# Patient Record
Sex: FEMALE | Race: WHITE | NOT HISPANIC OR LATINO | Employment: OTHER | URBAN - METROPOLITAN AREA
[De-identification: names, ages, dates, MRNs, and addresses within clinical notes are randomized per-mention and may not be internally consistent; named-entity substitution may affect disease eponyms.]

---

## 2023-04-26 RX ORDER — PANTOPRAZOLE SODIUM 40 MG/1
TABLET, DELAYED RELEASE ORAL
COMMUNITY
Start: 2023-04-14

## 2023-04-26 RX ORDER — SEMAGLUTIDE 1.34 MG/ML
INJECTION, SOLUTION SUBCUTANEOUS
COMMUNITY
Start: 2023-04-18

## 2023-04-26 RX ORDER — ATORVASTATIN CALCIUM 10 MG/1
TABLET, FILM COATED ORAL
COMMUNITY
Start: 2023-04-14

## 2023-04-26 RX ORDER — INSULIN GLARGINE 300 U/ML
INJECTION, SOLUTION SUBCUTANEOUS
COMMUNITY
Start: 2023-04-14

## 2023-04-26 RX ORDER — GLIMEPIRIDE 4 MG/1
TABLET ORAL
COMMUNITY
Start: 2023-04-20

## 2023-04-27 ENCOUNTER — OFFICE VISIT (OUTPATIENT)
Dept: GASTROENTEROLOGY | Facility: CLINIC | Age: 63
End: 2023-04-27

## 2023-04-27 VITALS
OXYGEN SATURATION: 98 % | SYSTOLIC BLOOD PRESSURE: 129 MMHG | DIASTOLIC BLOOD PRESSURE: 69 MMHG | HEART RATE: 104 BPM | HEIGHT: 65 IN | BODY MASS INDEX: 25.33 KG/M2 | WEIGHT: 152 LBS

## 2023-04-27 DIAGNOSIS — R14.0 BLOATING: ICD-10-CM

## 2023-04-27 DIAGNOSIS — R10.11 RIGHT UPPER QUADRANT ABDOMINAL PAIN: Primary | ICD-10-CM

## 2023-04-27 DIAGNOSIS — R10.31 RIGHT LOWER QUADRANT ABDOMINAL PAIN: ICD-10-CM

## 2023-04-27 DIAGNOSIS — R10.13 EPIGASTRIC ABDOMINAL PAIN: ICD-10-CM

## 2023-04-27 RX ORDER — ESOMEPRAZOLE MAGNESIUM 40 MG/1
40 CAPSULE, DELAYED RELEASE ORAL
Qty: 31 CAPSULE | Refills: 6 | Status: SHIPPED | OUTPATIENT
Start: 2023-04-27

## 2023-04-27 NOTE — PATIENT INSTRUCTIONS
Scheduled date of EGD(as of today):6/28/23  Physician performing EGD:Brayan  Location of EGD:[place]  Instructions reviewed with patient by:Ren herrera  Clearances:  none

## 2023-04-27 NOTE — LETTER
"May 1, 2023     [address]  [address] [place] [address]    Patient: Pako Dumont   YOB: 1960   Date of Visit: 4/27/2023       Dear Dr Geraldine Zheng: Thank you for referring Pako Dumont to me for evaluation  Below are my notes for this consultation  If you have questions, please do not hesitate to call me  I look forward to following your patient along with you  Sincerely,        Carrie Reddy DO        CC: No Recipients  Maya Castillo  4/27/2023 10:57 PM  Signed  [facility] - Outpatient Consultation  Pako Dumont 61 y o  female MRN: [id number]  Encounter: [id number]          ASSESSMENT AND PLAN:      1  Right upper quadrant abdominal pain  - US right upper quadrant; Future  - esomeprazole (NexIUM) 40 MG capsule; Take 1 capsule (40 mg total) by mouth daily before breakfast  Dispense: 31 capsule; Refill: 6  - EGD; Future    2  Epigastric abdominal pain  - esomeprazole (NexIUM) 40 MG capsule; Take 1 capsule (40 mg total) by mouth daily before breakfast  Dispense: 31 capsule; Refill: 6  - EGD; Future    3  Right lower quadrant abdominal pain    4  Bloating    ______________________________________________________________________    HPI:  This 61year old female presents to the office with the chief complaint of  An abdominal pain that starts up near the epigastrium and radiates in a \"C\" shape around the abdomen to the right lower quadrant and then to the suprapubic area  Shaped like a C  The pain comes and goes and is not provoked by anything in particular  There is nausea on occasion but not vomiting  She has a bowel movement daily  She admits to heartburn and acid reflux problems not controlled by pantoprazole  She's been taking the pantoprazole for over 5 years  No dysphagia or odynophagia     She had a colonoscopy performed on 2/21/23 which showed hemorrhoids and possible sigmoid colitis, " however, biopsies of the sigmoid colon did not demonstrate colitis  She also had diverticulosis  She denies any rectal bleeding, hematemesis, or melena  There is no family history of colon cancer  She states that she has been told in the past that she has a fatty liver but her last US was several years ago  She has not had an upper endoscopy  There is bloating and gaseousness  REVIEW OF SYSTEMS:    CONSTITUTIONAL: Denies any fever, chills, rigors, and weight loss  HEENT: No earache or tinnitus  Denies hearing loss or visual disturbances  CARDIOVASCULAR: No chest pain or palpitations  RESPIRATORY: Denies any cough, hemoptysis, shortness of breath or dyspnea on exertion  GASTROINTESTINAL: As noted in the History of Present Illness  GENITOURINARY: No problems with urination  Denies any hematuria or dysuria  NEUROLOGIC: No dizziness or vertigo, denies headaches  MUSCULOSKELETAL: Denies any muscle or joint pain  SKIN: Denies skin rashes or itching  ENDOCRINE: Denies excessive thirst  Denies intolerance to heat or cold  PSYCHOSOCIAL: Denies depression or anxiety  Denies any recent memory loss  Historical Information   Past Medical History:   Diagnosis Date    GERD (gastroesophageal reflux disease)     Hyperlipidemia      History reviewed  No pertinent surgical history  Social History   Social History     Substance and Sexual Activity   Alcohol Use Not Currently     Social History     Substance and Sexual Activity   Drug Use Not on file     Social History     Tobacco Use   Smoking Status Never   Smokeless Tobacco Never     History reviewed  No pertinent family history      Meds/Allergies        Current Outpatient Medications:     atorvastatin (LIPITOR) 10 mg tablet    Diclofenac Sodium (VOLTAREN) 1 %    esomeprazole (NexIUM) 40 MG capsule    glimepiride (AMARYL) 4 mg tablet    metFORMIN (GLUCOPHAGE) 500 mg tablet    Ozempic, 1 MG/DOSE, 4 MG/3ML injection pen   "pantoprazole (PROTONIX) 40 mg tablet    Toujeo SoloStar 300 units/mL CONCENTRATED U-300 injection pen (1-unit dial)    No Known Allergies        Objective      Blood pressure 129/69, pulse 104, height 5' 5\" (1 651 m), weight 68 9 kg (152 lb), SpO2 98 %  Body mass index is 25 29 kg/m²  PHYSICAL EXAM:      General Appearance:   Alert, cooperative, no distress   HEENT:   Normocephalic, atraumatic, anicteric      Neck:  Supple, symmetrical, trachea midline   Lungs:   Clear to auscultation bilaterally; no rales, rhonchi or wheezing; respirations unlabored    Heart[de-identified]   Regular rate and rhythm; no murmur, rub, or gallop  Abdomen:   Soft, non-tender, non-distended; normal bowel sounds; no masses, no organomegaly    Genitalia:   Deferred    Rectal:   Deferred    Extremities:  No cyanosis, clubbing or edema    Pulses:  2+ and symmetric    Skin:  No jaundice, rashes, or lesions    Lymph nodes:  No palpable cervical lymphadenopathy        Lab Results:   No visits with results within 1 Day(s) from this visit  Latest known visit with results is:   No results found for any previous visit  Radiology Results:   No results found      "

## 2023-04-28 NOTE — PROGRESS NOTES
"[facility] - Outpatient Consultation  Obed Villafana 61 y o  female MRN: [id number]  Encounter: [id number]          ASSESSMENT AND PLAN:      1  Right upper quadrant abdominal pain  - US right upper quadrant; Future  - esomeprazole (NexIUM) 40 MG capsule; Take 1 capsule (40 mg total) by mouth daily before breakfast  Dispense: 31 capsule; Refill: 6  - EGD; Future    2  Epigastric abdominal pain  - esomeprazole (NexIUM) 40 MG capsule; Take 1 capsule (40 mg total) by mouth daily before breakfast  Dispense: 31 capsule; Refill: 6  - EGD; Future    3  Right lower quadrant abdominal pain    4  Bloating    ______________________________________________________________________    HPI:  This 61year old female presents to the office with the chief complaint of  An abdominal pain that starts up near the epigastrium and radiates in a \"C\" shape around the abdomen to the right lower quadrant and then to the suprapubic area  Shaped like a C  The pain comes and goes and is not provoked by anything in particular  There is nausea on occasion but not vomiting  She has a bowel movement daily  She admits to heartburn and acid reflux problems not controlled by pantoprazole  She's been taking the pantoprazole for over 5 years  No dysphagia or odynophagia  She had a colonoscopy performed on 2/21/23 which showed hemorrhoids and possible sigmoid colitis, however, biopsies of the sigmoid colon did not demonstrate colitis  She also had diverticulosis  She denies any rectal bleeding, hematemesis, or melena  There is no family history of colon cancer  She states that she has been told in the past that she has a fatty liver but her last US was several years ago  She has not had an upper endoscopy  There is bloating and gaseousness  REVIEW OF SYSTEMS:    CONSTITUTIONAL: Denies any fever, chills, rigors, and weight loss  HEENT: No earache or tinnitus   Denies hearing loss or visual " "disturbances  CARDIOVASCULAR: No chest pain or palpitations  RESPIRATORY: Denies any cough, hemoptysis, shortness of breath or dyspnea on exertion  GASTROINTESTINAL: As noted in the History of Present Illness  GENITOURINARY: No problems with urination  Denies any hematuria or dysuria  NEUROLOGIC: No dizziness or vertigo, denies headaches  MUSCULOSKELETAL: Denies any muscle or joint pain  SKIN: Denies skin rashes or itching  ENDOCRINE: Denies excessive thirst  Denies intolerance to heat or cold  PSYCHOSOCIAL: Denies depression or anxiety  Denies any recent memory loss  Historical Information   Past Medical History:   Diagnosis Date   • GERD (gastroesophageal reflux disease)    • Hyperlipidemia      History reviewed  No pertinent surgical history  Social History   Social History     Substance and Sexual Activity   Alcohol Use Not Currently     Social History     Substance and Sexual Activity   Drug Use Not on file     Social History     Tobacco Use   Smoking Status Never   Smokeless Tobacco Never     History reviewed  No pertinent family history  Meds/Allergies       Current Outpatient Medications:   •  atorvastatin (LIPITOR) 10 mg tablet  •  Diclofenac Sodium (VOLTAREN) 1 %  •  esomeprazole (NexIUM) 40 MG capsule  •  glimepiride (AMARYL) 4 mg tablet  •  metFORMIN (GLUCOPHAGE) 500 mg tablet  •  Ozempic, 1 MG/DOSE, 4 MG/3ML injection pen  •  pantoprazole (PROTONIX) 40 mg tablet  •  Toujeo SoloStar 300 units/mL CONCENTRATED U-300 injection pen (1-unit dial)    No Known Allergies        Objective     Blood pressure 129/69, pulse 104, height 5' 5\" (1 651 m), weight 68 9 kg (152 lb), SpO2 98 %  Body mass index is 25 29 kg/m²          PHYSICAL EXAM:      General Appearance:   Alert, cooperative, no distress   HEENT:   Normocephalic, atraumatic, anicteric      Neck:  Supple, symmetrical, trachea midline   Lungs:   Clear to auscultation bilaterally; no rales, rhonchi or wheezing; respirations unlabored  " Heart[de-identified]   Regular rate and rhythm; no murmur, rub, or gallop  Abdomen:   Soft, non-tender, non-distended; normal bowel sounds; no masses, no organomegaly    Genitalia:   Deferred    Rectal:   Deferred    Extremities:  No cyanosis, clubbing or edema    Pulses:  2+ and symmetric    Skin:  No jaundice, rashes, or lesions    Lymph nodes:  No palpable cervical lymphadenopathy        Lab Results:   No visits with results within 1 Day(s) from this visit  Latest known visit with results is:   No results found for any previous visit  Radiology Results:   No results found

## 2023-05-03 ENCOUNTER — TELEPHONE (OUTPATIENT)
Dept: GASTROENTEROLOGY | Facility: CLINIC | Age: 63
End: 2023-05-03

## 2023-05-03 ENCOUNTER — HOSPITAL ENCOUNTER (OUTPATIENT)
Dept: ULTRASOUND IMAGING | Facility: CLINIC | Age: 63
Discharge: HOME | End: 2023-05-03

## 2023-05-03 DIAGNOSIS — R10.11 RIGHT UPPER QUADRANT ABDOMINAL PAIN: ICD-10-CM

## 2023-05-05 ENCOUNTER — TREATMENT (OUTPATIENT)
Dept: GASTROENTEROLOGY | Facility: CLINIC | Age: 63
End: 2023-05-05

## 2023-05-05 ENCOUNTER — TELEPHONE (OUTPATIENT)
Dept: GASTROENTEROLOGY | Facility: CLINIC | Age: 63
End: 2023-05-05

## 2023-05-05 DIAGNOSIS — D18.03 HEMANGIOMA OF LIVER: Primary | ICD-10-CM

## 2023-05-05 NOTE — TELEPHONE ENCOUNTER
LMOM for pt to contact our office    Hemangioma: A noncancerous liver mass composed of tangled blood vessels       As per Dr Joseph Easton   There are 2 lesions of the liver which most likely represent hemangiomas  Amy Curtis we need to do an MRI now of the liver to confirm that these are benign hemangiomas   I written the order  [facility] call the patient and request that she have this test done     There was nothing seen on the ultrasound which would explain her abdominal pain in the right upper quadrant

## 2023-05-05 NOTE — TELEPHONE ENCOUNTER
Patients GI provider:  Dr Ivone Zapata    Number to return call: [phone]    Reason for call: Thermon Prime from radiology calling with significant findings in US right upper quadrant  Ready to read I Epic  Tiger Text sent      Scheduled procedure/appointment date if applicable: Procedure 4/65/01

## 2023-05-19 ENCOUNTER — TELEPHONE (OUTPATIENT)
Dept: GASTROENTEROLOGY | Facility: CLINIC | Age: 63
End: 2023-05-19

## 2023-05-19 DIAGNOSIS — D18.03 HEMANGIOMA OF LIVER: Primary | ICD-10-CM

## 2023-05-19 NOTE — TELEPHONE ENCOUNTER
Patients GI provider:  Dr Tabby Aggarwal    Number to return call: [phone]    Reason for call: Doug Vitale calling from [facility] in [facility] stating pt needs lab work ordered prior to her MRI scheduled for 5/31/2023  She states the orders can either be CMP, BMP, or BUN creatinine   she states this is whatever the doctor prefers  She asked that you reach out to pt when labs are entered so pt knows she needs to get them done  Pt can be reached at above number  Doug Vitale can be reached at [phone] and fax is [phone]      Scheduled procedure/appointment date if applicable: Procedure: 1/85/4154

## 2023-05-22 ENCOUNTER — APPOINTMENT (OUTPATIENT)
Dept: LAB | Facility: CLINIC | Age: 63
End: 2023-05-22

## 2023-05-22 DIAGNOSIS — D18.03 HEMANGIOMA OF LIVER: ICD-10-CM

## 2023-05-22 LAB
ANION GAP SERPL CALCULATED.3IONS-SCNC: -1 MMOL/L (ref 4–13)
BUN SERPL-MCNC: 12 MG/DL (ref 5–25)
CALCIUM SERPL-MCNC: 9.1 MG/DL (ref 8.3–10.1)
CHLORIDE SERPL-SCNC: 110 MMOL/L (ref 96–108)
CO2 SERPL-SCNC: 31 MMOL/L (ref 21–32)
CREAT SERPL-MCNC: 0.69 MG/DL (ref 0.6–1.3)
GFR SERPL CREATININE-BSD FRML MDRD: 92 ML/MIN/1.73SQ M
GLUCOSE P FAST SERPL-MCNC: 113 MG/DL (ref 65–99)
POTASSIUM SERPL-SCNC: 4.3 MMOL/L (ref 3.5–5.3)
SODIUM SERPL-SCNC: 140 MMOL/L (ref 135–147)

## 2023-05-31 ENCOUNTER — HOSPITAL ENCOUNTER (OUTPATIENT)
Dept: MRI IMAGING | Facility: CLINIC | Age: 63
Discharge: HOME | End: 2023-05-31

## 2023-05-31 DIAGNOSIS — D18.03 HEMANGIOMA OF LIVER: ICD-10-CM

## 2023-05-31 RX ADMIN — GADOBUTROL 7 ML: 604.72 INJECTION INTRAVENOUS at 11:01

## 2023-06-28 ENCOUNTER — ANESTHESIA EVENT (OUTPATIENT)
Dept: GASTROENTEROLOGY | Facility: HOSPITAL | Age: 63
End: 2023-06-28

## 2023-06-28 ENCOUNTER — HOSPITAL ENCOUNTER (OUTPATIENT)
Dept: GASTROENTEROLOGY | Facility: HOSPITAL | Age: 63
Setting detail: OUTPATIENT SURGERY
Discharge: HOME | End: 2023-06-28
Attending: INTERNAL MEDICINE
Payer: COMMERCIAL

## 2023-06-28 ENCOUNTER — ANESTHESIA (OUTPATIENT)
Dept: GASTROENTEROLOGY | Facility: HOSPITAL | Age: 63
End: 2023-06-28

## 2023-06-28 VITALS
WEIGHT: 154.76 LBS | HEIGHT: 65 IN | DIASTOLIC BLOOD PRESSURE: 67 MMHG | TEMPERATURE: 98.6 F | HEART RATE: 91 BPM | OXYGEN SATURATION: 98 % | SYSTOLIC BLOOD PRESSURE: 151 MMHG | BODY MASS INDEX: 25.79 KG/M2 | RESPIRATION RATE: 16 BRPM

## 2023-06-28 DIAGNOSIS — R10.13 EPIGASTRIC ABDOMINAL PAIN: ICD-10-CM

## 2023-06-28 DIAGNOSIS — R10.11 RIGHT UPPER QUADRANT ABDOMINAL PAIN: ICD-10-CM

## 2023-06-28 PROCEDURE — 82948 REAGENT STRIP/BLOOD GLUCOSE: CPT

## 2023-06-28 PROCEDURE — 88305 TISSUE EXAM BY PATHOLOGIST: CPT | Performed by: PATHOLOGY

## 2023-06-28 PROCEDURE — 43239 EGD BIOPSY SINGLE/MULTIPLE: CPT | Performed by: INTERNAL MEDICINE

## 2023-06-28 RX ORDER — PROPOFOL 10 MG/ML
INJECTION, EMULSION INTRAVENOUS AS NEEDED
Status: DISCONTINUED | OUTPATIENT
Start: 2023-06-28 | End: 2023-06-28

## 2023-06-28 RX ORDER — LIDOCAINE HYDROCHLORIDE 20 MG/ML
INJECTION, SOLUTION EPIDURAL; INFILTRATION; INTRACAUDAL; PERINEURAL AS NEEDED
Status: DISCONTINUED | OUTPATIENT
Start: 2023-06-28 | End: 2023-06-28

## 2023-06-28 RX ORDER — SODIUM CHLORIDE, SODIUM LACTATE, POTASSIUM CHLORIDE, CALCIUM CHLORIDE 600; 310; 30; 20 MG/100ML; MG/100ML; MG/100ML; MG/100ML
INJECTION, SOLUTION INTRAVENOUS CONTINUOUS PRN
Status: DISCONTINUED | OUTPATIENT
Start: 2023-06-28 | End: 2023-06-28

## 2023-06-28 RX ADMIN — LIDOCAINE HYDROCHLORIDE 100 MG: 20 INJECTION, SOLUTION EPIDURAL; INFILTRATION; INTRACAUDAL; PERINEURAL at 12:51

## 2023-06-28 RX ADMIN — PROPOFOL 30 MG: 10 INJECTION, EMULSION INTRAVENOUS at 12:57

## 2023-06-28 RX ADMIN — SODIUM CHLORIDE, SODIUM LACTATE, POTASSIUM CHLORIDE, AND CALCIUM CHLORIDE: .6; .31; .03; .02 INJECTION, SOLUTION INTRAVENOUS at 12:51

## 2023-06-28 RX ADMIN — PROPOFOL 30 MG: 10 INJECTION, EMULSION INTRAVENOUS at 12:54

## 2023-06-28 RX ADMIN — PROPOFOL 20 MG: 10 INJECTION, EMULSION INTRAVENOUS at 12:59

## 2023-06-28 RX ADMIN — PROPOFOL 60 MG: 10 INJECTION, EMULSION INTRAVENOUS at 12:51

## 2023-06-28 NOTE — H&P
History and Physical - [facility]  Jensen Puga 61 y.o. female MRN: [id number]      HPI: Jensen Puga is a 61y.o. year old female who presents for evaluation of abdominal pain and bloating      REVIEW OF SYSTEMS: Per the HPI, and otherwise unremarkable. Historical Information   Past Medical History:   Diagnosis Date   • Diabetes mellitus ([address])    • GERD (gastroesophageal reflux disease)    • Hyperlipidemia      Past Surgical History:   Procedure Laterality Date   • COLONOSCOPY     • HYSTERECTOMY     • TONSILLECTOMY       Social History   Social History     Substance and Sexual Activity   Alcohol Use Yes    Comment: RARELY     Social History     Substance and Sexual Activity   Drug Use Never     Social History     Tobacco Use   Smoking Status Some Days   • Types: Cigarettes   Smokeless Tobacco Never   Tobacco Comments    SOCIAL CIGARETTES     History reviewed. No pertinent family history. Meds/Allergies     (Not in a hospital admission)      No Known Allergies    Objective     Blood pressure 140/75, pulse 94, temperature 97.6 °F (36.4 °C), temperature source Temporal, resp. rate 20, height 5' 5" (1.651 m), weight 70.2 kg (154 lb 12.2 oz), SpO2 99 %. PHYSICAL EXAM    Gen: NAD  CV: RRR  CHEST: Clear  ABD: soft, NT/ND  EXT: no edema      ASSESSMENT/PLAN:  This is a 61y.o. year old female here for EGD with biopsy, and she is stable and optimized for her procedure.

## 2023-06-28 NOTE — ANESTHESIA POSTPROCEDURE EVALUATION
Post-Op Assessment Note    CV Status:  Stable    Pain management: adequate     Mental Status:  Alert and awake   Hydration Status:  Euvolemic   PONV Controlled:  Controlled   Airway Patency:  Patent      Post Op Vitals Reviewed: Yes      Staff: CRNA         No notable events documented      /80 (06/28/23 1303)    Temp 98 6 °F (37 °C) (06/28/23 1303)    Pulse 89 (06/28/23 1303)   Resp 16 (06/28/23 1303)    SpO2 96 % (06/28/23 1303)

## 2023-06-29 LAB — GLUCOSE SERPL-MCNC: 101 MG/DL (ref 65–140)

## 2023-07-07 ENCOUNTER — TELEPHONE (OUTPATIENT)
Dept: GASTROENTEROLOGY | Facility: CLINIC | Age: 63
End: 2023-07-07

## 2023-07-07 DIAGNOSIS — R10.31 RIGHT LOWER QUADRANT ABDOMINAL PAIN: ICD-10-CM

## 2023-07-07 DIAGNOSIS — R10.11 RIGHT UPPER QUADRANT ABDOMINAL PAIN: Primary | ICD-10-CM

## 2023-07-07 RX ORDER — DICYCLOMINE HCL 20 MG
20 TABLET ORAL EVERY 6 HOURS PRN
Qty: 30 TABLET | Refills: 3 | Status: SHIPPED | OUTPATIENT
Start: 2023-07-07

## 2023-07-07 NOTE — TELEPHONE ENCOUNTER
Pt states she received message in regards to her benign results. She is grateful everything is benign but has some questions and concerns on why she is still having right sided abdominal pain. Is asking if her appendix was also looked at? Says her pain is not worse but is unchanged and is wondering what the next step is at this time regarding her care?

## 2023-07-07 NOTE — TELEPHONE ENCOUNTER
----- Message from Yosvany Lewis DO sent at 7/7/2023  7:26 AM EDT -----  I sent the results to the patient's Aretha Truong. Biopsies of small intestine and stomach were both benign. [Normal] : oriented to person, place and time, the affect was normal, the mood was normal and not anxious [Affect] : the affect was normal [FreeTextEntry1] : GYN exam deferred today,

## 2023-07-07 NOTE — TELEPHONE ENCOUNTER
called and spoke to patient. Gave patient message as per Lesa Lea.  Gave patient central scheduling's # to schedule Ct scan of abd & pelvis with contrast.

## 2023-07-18 ENCOUNTER — HOSPITAL ENCOUNTER (OUTPATIENT)
Dept: CT IMAGING | Facility: CLINIC | Age: 63
Discharge: HOME | End: 2023-07-18
Payer: COMMERCIAL

## 2023-07-18 DIAGNOSIS — R10.31 RIGHT LOWER QUADRANT ABDOMINAL PAIN: ICD-10-CM

## 2023-07-18 DIAGNOSIS — R10.11 RIGHT UPPER QUADRANT ABDOMINAL PAIN: ICD-10-CM

## 2023-07-18 PROCEDURE — G1004 CDSM NDSC: HCPCS

## 2023-07-18 PROCEDURE — 74177 CT ABD & PELVIS W/CONTRAST: CPT

## 2023-07-18 RX ADMIN — IOHEXOL 100 ML: 350 INJECTION, SOLUTION INTRAVENOUS at 10:23

## 2023-07-24 ENCOUNTER — TELEPHONE (OUTPATIENT)
Age: 63
End: 2023-07-24

## 2023-07-24 NOTE — TELEPHONE ENCOUNTER
Radiology called with immediate findings for Cat scan of ABD and pelvis, ordered by REILLY Palacio on 07.18.23

## 2023-07-26 ENCOUNTER — TELEPHONE (OUTPATIENT)
Dept: GASTROENTEROLOGY | Facility: CLINIC | Age: 63
End: 2023-07-26

## 2023-07-26 NOTE — TELEPHONE ENCOUNTER
----- Message from [address] [place] [address] PA-C sent at 7/26/2023 12:58 PM EDT -----  Please advise patient this looks good. Renal lesions are benign. There is possible inflammation of the urinary bladder - does she have any urinary symptoms?

## 2023-07-26 NOTE — TELEPHONE ENCOUNTER
Called patient back and got  . LMOM with message as per juvenal.  Patient voiced understanding and had no further questions or concerns

## 2023-07-26 NOTE — TELEPHONE ENCOUNTER
Called and spoke to patient. Gave patient test results. Patient stated that she did have a UTI and did just finish antibiotics. Patient is asking what caused the renal lesions? Was it the UTI ? Please advise. Thank you !

## 2023-08-04 ENCOUNTER — TELEPHONE (OUTPATIENT)
Age: 63
End: 2023-08-04

## 2023-08-04 NOTE — TELEPHONE ENCOUNTER
Patients GI provider:  Dr. Alyssa Jolley    Number to return call: [phone]    Reason for call: Pt called in requesting to have all of her result records mailed to her home address on file for her. Requested to email the med release form to [email or web address].      Scheduled procedure/appointment date if applicable: Apt/procedure NA

## 2023-08-04 NOTE — TELEPHONE ENCOUNTER
Called and LMOM that we need a medical release form to be filled out. One was sent to her email. Once that is filled out then we can proceed with request. Any questions to call the office.

## 2024-01-10 ENCOUNTER — TELEPHONE (OUTPATIENT)
Dept: GASTROENTEROLOGY | Facility: CLINIC | Age: 64
End: 2024-01-10

## 2024-01-15 DIAGNOSIS — R10.13 EPIGASTRIC ABDOMINAL PAIN: ICD-10-CM

## 2024-01-15 DIAGNOSIS — R10.11 RIGHT UPPER QUADRANT ABDOMINAL PAIN: ICD-10-CM

## 2024-01-15 RX ORDER — ESOMEPRAZOLE MAGNESIUM 40 MG/1
40 CAPSULE, DELAYED RELEASE ORAL
Qty: 90 CAPSULE | Refills: 1 | Status: SHIPPED | OUTPATIENT
Start: 2024-01-15

## 2024-01-15 NOTE — TELEPHONE ENCOUNTER
Reason for call:   [x] Refill   [] Prior Auth  [] Other:     Office:   [] PCP/Provider -   [x] Specialty/Provider - Brayan - GASTRO     Medication: Esomeprazole     Dose/Frequency: 40mg - 1 Capsule Daily     Quantity: 90    Pharmacy: [facility]     Does the patient have enough for 3 days?   [x] Yes   [] No - Send as HP to POD